# Patient Record
Sex: MALE | Race: WHITE | NOT HISPANIC OR LATINO | Employment: FULL TIME | URBAN - METROPOLITAN AREA
[De-identification: names, ages, dates, MRNs, and addresses within clinical notes are randomized per-mention and may not be internally consistent; named-entity substitution may affect disease eponyms.]

---

## 2021-08-19 ENCOUNTER — HOSPITAL ENCOUNTER (EMERGENCY)
Facility: CLINIC | Age: 51
Discharge: HOME | End: 2021-08-19
Attending: EMERGENCY MEDICINE | Admitting: EMERGENCY MEDICINE

## 2021-08-19 VITALS
RESPIRATION RATE: 20 BRPM | DIASTOLIC BLOOD PRESSURE: 94 MMHG | SYSTOLIC BLOOD PRESSURE: 144 MMHG | HEART RATE: 92 BPM | OXYGEN SATURATION: 96 % | TEMPERATURE: 97.9 F

## 2021-08-19 DIAGNOSIS — E11.9 TYPE 2 DIABETES MELLITUS WITHOUT COMPLICATION, WITHOUT LONG-TERM CURRENT USE OF INSULIN (H): ICD-10-CM

## 2021-08-19 LAB
ALBUMIN SERPL-MCNC: 4.2 G/DL (ref 3.4–5)
ALP SERPL-CCNC: 86 U/L (ref 40–150)
ALT SERPL W P-5'-P-CCNC: 67 U/L (ref 0–70)
ANION GAP SERPL CALCULATED.3IONS-SCNC: 4 MMOL/L (ref 3–14)
AST SERPL W P-5'-P-CCNC: 27 U/L (ref 0–45)
ATRIAL RATE - MUSE: 122 BPM
BASOPHILS # BLD AUTO: 0.1 10E3/UL (ref 0–0.2)
BASOPHILS NFR BLD AUTO: 1 %
BILIRUB SERPL-MCNC: 0.6 MG/DL (ref 0.2–1.3)
BUN SERPL-MCNC: 17 MG/DL (ref 7–30)
CALCIUM SERPL-MCNC: 9.2 MG/DL (ref 8.5–10.1)
CHLORIDE BLD-SCNC: 103 MMOL/L (ref 94–109)
CO2 SERPL-SCNC: 28 MMOL/L (ref 20–32)
CREAT SERPL-MCNC: 1.11 MG/DL (ref 0.66–1.25)
DIASTOLIC BLOOD PRESSURE - MUSE: NORMAL MMHG
EOSINOPHIL # BLD AUTO: 0.1 10E3/UL (ref 0–0.7)
EOSINOPHIL NFR BLD AUTO: 1 %
ERYTHROCYTE [DISTWIDTH] IN BLOOD BY AUTOMATED COUNT: 11.7 % (ref 10–15)
GFR SERPL CREATININE-BSD FRML MDRD: 76 ML/MIN/1.73M2
GLUCOSE BLD-MCNC: 326 MG/DL (ref 70–99)
GLUCOSE BLDC GLUCOMTR-MCNC: 352 MG/DL (ref 70–99)
HCT VFR BLD AUTO: 46.3 % (ref 40–53)
HGB BLD-MCNC: 16.6 G/DL (ref 13.3–17.7)
HOLD SPECIMEN: NORMAL
IMM GRANULOCYTES # BLD: 0 10E3/UL
IMM GRANULOCYTES NFR BLD: 0 %
INTERPRETATION ECG - MUSE: NORMAL
LYMPHOCYTES # BLD AUTO: 1.9 10E3/UL (ref 0.8–5.3)
LYMPHOCYTES NFR BLD AUTO: 23 %
MCH RBC QN AUTO: 30.1 PG (ref 26.5–33)
MCHC RBC AUTO-ENTMCNC: 35.9 G/DL (ref 31.5–36.5)
MCV RBC AUTO: 84 FL (ref 78–100)
MONOCYTES # BLD AUTO: 0.6 10E3/UL (ref 0–1.3)
MONOCYTES NFR BLD AUTO: 7 %
NEUTROPHILS # BLD AUTO: 5.5 10E3/UL (ref 1.6–8.3)
NEUTROPHILS NFR BLD AUTO: 68 %
NRBC # BLD AUTO: 0 10E3/UL
NRBC BLD AUTO-RTO: 0 /100
P AXIS - MUSE: 27 DEGREES
PLATELET # BLD AUTO: 220 10E3/UL (ref 150–450)
POTASSIUM BLD-SCNC: 4.2 MMOL/L (ref 3.4–5.3)
PR INTERVAL - MUSE: 154 MS
PROT SERPL-MCNC: 8 G/DL (ref 6.8–8.8)
QRS DURATION - MUSE: 72 MS
QT - MUSE: 300 MS
QTC - MUSE: 427 MS
R AXIS - MUSE: 58 DEGREES
RBC # BLD AUTO: 5.51 10E6/UL (ref 4.4–5.9)
SODIUM SERPL-SCNC: 135 MMOL/L (ref 133–144)
SYSTOLIC BLOOD PRESSURE - MUSE: NORMAL MMHG
T AXIS - MUSE: 29 DEGREES
VENTRICULAR RATE- MUSE: 122 BPM
WBC # BLD AUTO: 8.2 10E3/UL (ref 4–11)

## 2021-08-19 PROCEDURE — 85025 COMPLETE CBC W/AUTO DIFF WBC: CPT | Performed by: EMERGENCY MEDICINE

## 2021-08-19 PROCEDURE — 93005 ELECTROCARDIOGRAM TRACING: CPT | Performed by: EMERGENCY MEDICINE

## 2021-08-19 PROCEDURE — 82040 ASSAY OF SERUM ALBUMIN: CPT | Performed by: EMERGENCY MEDICINE

## 2021-08-19 PROCEDURE — 96360 HYDRATION IV INFUSION INIT: CPT | Performed by: EMERGENCY MEDICINE

## 2021-08-19 PROCEDURE — 258N000003 HC RX IP 258 OP 636: Performed by: EMERGENCY MEDICINE

## 2021-08-19 PROCEDURE — 93010 ELECTROCARDIOGRAM REPORT: CPT | Performed by: EMERGENCY MEDICINE

## 2021-08-19 PROCEDURE — 96361 HYDRATE IV INFUSION ADD-ON: CPT | Performed by: EMERGENCY MEDICINE

## 2021-08-19 PROCEDURE — 99284 EMERGENCY DEPT VISIT MOD MDM: CPT | Mod: 25 | Performed by: EMERGENCY MEDICINE

## 2021-08-19 PROCEDURE — 36415 COLL VENOUS BLD VENIPUNCTURE: CPT | Performed by: EMERGENCY MEDICINE

## 2021-08-19 PROCEDURE — 99284 EMERGENCY DEPT VISIT MOD MDM: CPT | Performed by: EMERGENCY MEDICINE

## 2021-08-19 RX ORDER — SODIUM CHLORIDE 9 MG/ML
INJECTION, SOLUTION INTRAVENOUS CONTINUOUS
Status: DISCONTINUED | OUTPATIENT
Start: 2021-08-19 | End: 2021-08-19 | Stop reason: HOSPADM

## 2021-08-19 RX ADMIN — SODIUM CHLORIDE 1000 ML: 9 INJECTION, SOLUTION INTRAVENOUS at 13:39

## 2021-08-19 ASSESSMENT — ENCOUNTER SYMPTOMS
DYSURIA: 0
CONSTIPATION: 0
ABDOMINAL PAIN: 0
SPEECH DIFFICULTY: 0
DECREASED CONCENTRATION: 0
PHOTOPHOBIA: 0
NAUSEA: 0
RHINORRHEA: 0
ACTIVITY CHANGE: 0
COUGH: 0
WOUND: 0
APNEA: 1
PALPITATIONS: 0
HEADACHES: 0
HEMATURIA: 0
VOMITING: 0
DIARRHEA: 0
CONFUSION: 0
CHILLS: 0
CHEST TIGHTNESS: 1
SHORTNESS OF BREATH: 1
LIGHT-HEADEDNESS: 1
WEAKNESS: 0
APPETITE CHANGE: 0
FEVER: 0
UNEXPECTED WEIGHT CHANGE: 0
POLYDIPSIA: 1
SORE THROAT: 0
FATIGUE: 0

## 2021-08-19 NOTE — PROGRESS NOTES
/Care Management Follow Up    Length of Stay (days): 0    Expected Discharge Date: 08/19/2021       Anticipated Discharge Disposition:  Home     Anticipated Discharge Services:  NA  Anticipated Discharge DME:  NA    Additional Information:  Patient does not have insurance. Message left form Marilee Martinez in financial counseling to get in touch with patient to assist with insurance options. Patient newly diagnosed with diabetes and will need a new PCP appointment. Referral sent to centralized scheduling. RNCC available as needed.      Ila Oneil RN, BSN  Care Coordinator,  [facility]  Phone [phone]  Pager [phone]

## 2021-08-19 NOTE — DISCHARGE INSTRUCTIONS
Our  is coordinating your health insurance sign up and your primary care follow-up.    You will be started on a prescription that is an oral tablet for diabetes.    Please make an appointment to follow up with a primary care clinic within the next 1 to 2 weeks.  Unless otherwise instructed by our , you may call the primary care line to set up an appointment [facility] (phone: [phone]) explaining to them that you are in the process of obtaining insurance.

## 2021-08-19 NOTE — PLAN OF CARE
Problem: Discharge Planning  Goal: Discharge Planning (Adult, OB, Behavioral, Peds)  Flowsheets (Taken 8/19/2021 1617)  Patient/Family in Agreement with Plan: yes  Transportation Anticipated: family or friend will provide  Concerns to be Addressed: all concerns addressed in this encounter  Patient/Family Anticipates Transition to: home  Patient/family educated on Medicare website which has current facility and service quality ratings: no  [facility] Task Needed: (new PCP with [place] provider) Yes  Anticipated Discharge Disposition (CM/SW): Home  Anticipated Discharge Services (CM/SW): None  Anticipated Discharge DME (CM/SW): None

## 2021-08-19 NOTE — ED NOTES
Spend 20 minutes doing diabetic education with patient, medication and discharge instructions reviewed and coordinated with social work.

## 2021-08-19 NOTE — ED PROVIDER NOTES
"ED Provider Note  [facility]      History     Chief Complaint   Patient presents with     Hyperglycemia     HPI  Mark Anthony Stanley is a 50 year old male with past medical history of sleep apnea and diverticulitis, who presents after a research study disqualified him because he had diabetes. Mark Anthony was found to have a fasting blood glucose of 253 on screening labs for a research study. Mark Anthony has not noticed any acute symptoms that are out of the ordinary, but presents to the ED because he has no health insurance and wants to figure out how to get set up with primary care. Mark Anthony notes that he does not eat very well and normally eats oatmeal and yogurt for breakfast, fast food such as [employer] for lunch, and dinner involving meals such as spaghetti or tacos. He reports that he eats a lot of snacks as well and drinks 3-4 mountain dews per day. Mark Anthony notes that he drinks about 64oz of water each day in addition to 3-4 mountain dews. He reports that he pees about 4x per day. Mark Anthony has tingling in his feet in the AM that has been going on for some time, but reports no other loss of sensation in his legs or arms. He also reports that he has felt more lightheaded recently and describes this as \"brain fog\", which mostly occurs when he stands up. Mark Anthony denies any chest pain, but does have some SOB when performing physical activity. He notes that he has a history of sleep apnea controlled with CPAP, but has not used his CPAP for years. Mark Anthony denies any blurry vision, perception of racing heart, recent weight loss/gain, or abdominal pain.    Past Medical History  Sleep apnea  Diverticulitis    Past Surgical History:   Procedure Laterality Date     NO HISTORY OF SURGERY       metFORMIN (GLUCOPHAGE) 500 MG tablet  IBUPROFEN PO  ondansetron (ZOFRAN ODT) 4 MG disintegrating tablet      No Known Allergies     Family History  Mother - T2DM  Mother - Pancreatic cancer  Niece - T1DM    Social " History   Social History     Tobacco Use     Smoking status: Never Smoker   Substance Use Topics     Alcohol use: Not on file     Drug use: Not on file      Past medical history, past surgical history, medications, allergies, family history, and social history were reviewed with the patient. No additional pertinent items.       Review of Systems   Constitutional: Negative for activity change, appetite change, chills, fatigue, fever and unexpected weight change.   HENT: Negative for congestion, postnasal drip, rhinorrhea, sneezing and sore throat.    Eyes: Negative for photophobia and visual disturbance.   Respiratory: Positive for apnea (sleep), chest tightness and shortness of breath (With exercise). Negative for cough.    Cardiovascular: Negative for chest pain, palpitations and leg swelling.   Gastrointestinal: Negative for abdominal pain, constipation, diarrhea, nausea and vomiting.   Endocrine: Positive for polydipsia and polyuria.   Genitourinary: Negative for dysuria and hematuria.   Skin: Negative for rash and wound.   Neurological: Positive for light-headedness. Negative for syncope, speech difficulty, weakness and headaches.   Psychiatric/Behavioral: Negative for behavioral problems, confusion and decreased concentration.     A complete review of systems was performed with pertinent positives and negatives noted in the HPI, and all other systems negative.    Physical Exam   BP: (!) 131/101  Pulse: (!) 134  Temp: 97.9  F (36.6  C)  Resp: 20  SpO2: 94 %  Physical Exam  Vitals and nursing note reviewed.   Constitutional:       General: He is not in acute distress.     Appearance: Normal appearance. He is not ill-appearing or toxic-appearing.   HENT:      Head: Normocephalic and atraumatic.      Right Ear: External ear normal.      Left Ear: External ear normal.      Nose: Nose normal. No congestion or rhinorrhea.      Mouth/Throat:      Mouth: Mucous membranes are moist.      Pharynx: Oropharynx is clear. No  oropharyngeal exudate or posterior oropharyngeal erythema.   Eyes:      Extraocular Movements: Extraocular movements intact.      Conjunctiva/sclera: Conjunctivae normal.      Pupils: Pupils are equal, round, and reactive to light.   Cardiovascular:      Rate and Rhythm: Regular rhythm. Tachycardia present.      Heart sounds: Normal heart sounds. No murmur heard.   No friction rub. No gallop.    Pulmonary:      Effort: Pulmonary effort is normal. No respiratory distress.      Breath sounds: Normal breath sounds. No wheezing, rhonchi or rales.   Abdominal:      Palpations: Abdomen is soft.      Tenderness: There is no abdominal tenderness. There is no guarding or rebound.   Musculoskeletal:         General: No tenderness.      Right lower leg: No edema.      Left lower leg: No edema.   Skin:     General: Skin is warm.      Findings: No bruising, erythema, lesion or rash.   Neurological:      General: No focal deficit present.      Mental Status: He is alert and oriented to person, place, and time.      Sensory: No sensory deficit (Intact bilateral feet).      Motor: No weakness.   Psychiatric:         Mood and Affect: Mood normal.         Behavior: Behavior normal.       ED Course      Procedures       EKG obtained and shoed no acute cardiac pathology. Initial POC glucose collected of 352. Labs collected including CMP, CBC w/diff. 1L NS bolus given in ED. Social work consulted and provided options for patient to set up health insurance.    EKG reveals a sinus tach at a rate of 122 with a [place] interval of 0.154 and a QRS duration of 0.072.  The patient had a normal axis with no acute ST or T wave changes significant for ischemia.  This is read by me personally.     Results for orders placed or performed during the hospital encounter of 08/19/21   CBC with platelets differential     Status: None    Narrative    The following orders were created for panel order CBC with platelets differential.  Procedure                                Abnormality         Status                     ---------                               -----------         ------                     CBC with platelets and d...[[id number]]                      Final result                 Please view results for these tests on the individual orders.   Comprehensive metabolic panel     Status: Abnormal   Result Value Ref Range    Sodium 135 133 - 144 mmol/L    Potassium 4.2 3.4 - 5.3 mmol/L    Chloride 103 94 - 109 mmol/L    Carbon Dioxide (CO2) 28 20 - 32 mmol/L    Anion Gap 4 3 - 14 mmol/L    Urea Nitrogen 17 7 - 30 mg/dL    Creatinine 1.11 0.66 - 1.25 mg/dL    Calcium 9.2 8.5 - 10.1 mg/dL    Glucose 326 (H) 70 - 99 mg/dL    Alkaline Phosphatase 86 40 - 150 U/L    AST 27 0 - 45 U/L    ALT 67 0 - 70 U/L    Protein Total 8.0 6.8 - 8.8 g/dL    Albumin 4.2 3.4 - 5.0 g/dL    Bilirubin Total 0.6 0.2 - 1.3 mg/dL    GFR Estimate 76 >60 mL/min/1.73m2   CBC with platelets and differential     Status: None   Result Value Ref Range    WBC Count 8.2 4.0 - 11.0 10e3/uL    RBC Count 5.51 4.40 - 5.90 10e6/uL    Hemoglobin 16.6 13.3 - 17.7 g/dL    Hematocrit 46.3 40.0 - 53.0 %    MCV 84 78 - 100 fL    MCH 30.1 26.5 - 33.0 pg    MCHC 35.9 31.5 - 36.5 g/dL    RDW 11.7 10.0 - 15.0 %    Platelet Count 220 150 - 450 10e3/uL    % Neutrophils 68 %    % Lymphocytes 23 %    % Monocytes 7 %    % Eosinophils 1 %    % Basophils 1 %    % Immature Granulocytes 0 %    NRBCs per 100 WBC 0 <1 /100    Absolute Neutrophils 5.5 1.6 - 8.3 10e3/uL    Absolute Lymphocytes 1.9 0.8 - 5.3 10e3/uL    Absolute Monocytes 0.6 0.0 - 1.3 10e3/uL    Absolute Eosinophils 0.1 0.0 - 0.7 10e3/uL    Absolute Basophils 0.1 0.0 - 0.2 10e3/uL    Absolute Immature Granulocytes 0.0 <=0.0 10e3/uL    Absolute NRBCs 0.0 10e3/uL   Extra Red Top Tube     Status: None   Result Value Ref Range    Hold Specimen JIC    Glucose by meter     Status: Abnormal   Result Value Ref Range    GLUCOSE BY METER POCT 352 (H) 70 - 99 mg/dL   EKG 12  lead     Status: None (Preliminary result)   Result Value Ref Range    Systolic Blood Pressure  mmHg    Diastolic Blood Pressure  mmHg    Ventricular Rate 122 BPM    Atrial Rate 122 BPM    KS Interval 154 ms    QRS Duration 72 ms     ms    QTc 427 ms    P Axis 27 degrees    R AXIS 58 degrees    T Axis 29 degrees    Interpretation ECG Sinus tachycardia  Otherwise normal ECG      [facility] Draw     Status: None    Narrative    The following orders were created for panel order [place] Draw.  Procedure                               Abnormality         Status                     ---------                               -----------         ------                     Extra Red Top Tube[[id number]]                               Final result                 Please view results for these tests on the individual orders.     Medications   sodium chloride (PF) 0.9% PF flush 3 mL (3 mLs Intracatheter Given 8/19/21 1339)   sodium chloride (PF) 0.9% PF flush 3 mL (has no administration in time range)   sodium chloride 0.9% infusion (has no administration in time range)   0.9% sodium chloride BOLUS (1,000 mLs Intravenous New Bag 8/19/21 1339)     Orders Placed This Encounter   Procedures     CBC with platelets differential     Comprehensive metabolic panel     CBC with platelets and differential     Extra Red Top Tube     Glucose by meter     EKG 12 lead     Glucose monitor nursing POCT     Peripheral IV catheter          Assessments & Plan (with Medical Decision Making)   Mark Anthony Stanley is a 50 year old male with past medical history of sleep apnea and diverticulitis, who presents after a research study disqualified him because he had diabetes. It appears that blood glucose has been elevated for some time (290 in 2019), but patient does not often seek out healthcare. Mark Anthony has some signs of peripheral neuropathy with tingling in his feet in the mornings, but no loss of sensation in the feet bilaterally. He has not noticed any  changes in his vision recently. Blood glucose today of 352 and no signs of DKA such as anion gap metabolic acidosis. Mark Anthony has no health insurance currently and social work was consulted to provide options for the patient. Patient will be discharged home with Metformin prescription and information on setting up health insurance as well as a referral to a primary care provider. Metformin was prescribed to be filled at the pharmacy here so that it can retroactively be covered by future insurance. Return precautions include severe nausea and vomiting, lightheadedness causing syncope, or chest pain that is exacerbated by exertion and does not subside.     I have reviewed the nursing notes. I have reviewed the findings, diagnosis, plan and need for follow up with the patient.    New Prescriptions    METFORMIN (GLUCOPHAGE) 500 MG TABLET    Take 1 tablet (500 mg) by mouth 2 times daily (with meals)       Final diagnoses:   Type 2 diabetes mellitus without complication, without long-term current use of insulin (H) - new onset     Brandon Sheppard, MS4  [employer] [facility] [employer]    Our  is coordinating your health insurance sign up and your primary care follow-up.    You will be started on a prescription that is an oral tablet for diabetes.    Please make an appointment to follow up with a primary care clinic within the next 1 to 2 weeks.  Unless otherwise instructed by our , you may call the primary care line to set up an appointment [facility] (phone: [phone]) explaining to them that you are in the process of obtaining insurance.        --    ED Attending Physician Attestation    I Nicko Kessler MD, MD, cared for this patient with the Medical Student. I performed, or re-performed, the physical exam and medical decision-making. I have verified the accuracy of all the medical student findings and documentation above, and have edited as necessary.    Summary of  HPI, PE, ED Course   Patient is a 50 year old male evaluated in the emergency department for hyperglycemia. Exam notable for tachycardia. ED course notable for untreated diabetes. After the completion of care in the emergency department, the patient was discharged.    Critical Care & Procedures  Not applicable.    Medical Decision Making  The medical record was reviewed and interpreted.  Current labs reviewed and interpreted.  EKG reviewed and interpreted: by me.      Nicko Kessler MD, MD  Emergency Medicine     --  Nicko Kessler Md  [facility] ED EMERGENCY DEPARTMENT  8/19/2021     Nicko Kessler MD  08/21/21 1104

## 2021-08-19 NOTE — ED TRIAGE NOTES
Pt participated in a study for money and was told he was not a candidate because he has diabetes and was told to see a doctor.  Pt does not have a clinic so he came here to get things started.  Pt A1C 9.9

## 2021-09-04 ENCOUNTER — HEALTH MAINTENANCE LETTER (OUTPATIENT)
Age: 51
End: 2021-09-04

## 2021-12-25 ENCOUNTER — HEALTH MAINTENANCE LETTER (OUTPATIENT)
Age: 51
End: 2021-12-25

## 2022-04-16 ENCOUNTER — HEALTH MAINTENANCE LETTER (OUTPATIENT)
Age: 52
End: 2022-04-16

## 2022-08-06 ENCOUNTER — HEALTH MAINTENANCE LETTER (OUTPATIENT)
Age: 52
End: 2022-08-06

## 2022-10-16 ENCOUNTER — HEALTH MAINTENANCE LETTER (OUTPATIENT)
Age: 52
End: 2022-10-16

## 2022-12-04 ENCOUNTER — HEALTH MAINTENANCE LETTER (OUTPATIENT)
Age: 52
End: 2022-12-04

## 2023-04-01 ENCOUNTER — HEALTH MAINTENANCE LETTER (OUTPATIENT)
Age: 53
End: 2023-04-01

## 2023-08-26 ENCOUNTER — HEALTH MAINTENANCE LETTER (OUTPATIENT)
Age: 53
End: 2023-08-26

## 2023-11-04 ENCOUNTER — HEALTH MAINTENANCE LETTER (OUTPATIENT)
Age: 53
End: 2023-11-04

## 2024-01-13 ENCOUNTER — HEALTH MAINTENANCE LETTER (OUTPATIENT)
Age: 54
End: 2024-01-13

## 2024-06-01 ENCOUNTER — HEALTH MAINTENANCE LETTER (OUTPATIENT)
Age: 54
End: 2024-06-01